# Patient Record
Sex: FEMALE | Race: OTHER | HISPANIC OR LATINO
[De-identification: names, ages, dates, MRNs, and addresses within clinical notes are randomized per-mention and may not be internally consistent; named-entity substitution may affect disease eponyms.]

---

## 2024-10-28 ENCOUNTER — NON-APPOINTMENT (OUTPATIENT)
Age: 39
End: 2024-10-28

## 2024-10-28 ENCOUNTER — APPOINTMENT (OUTPATIENT)
Dept: OBGYN | Facility: CLINIC | Age: 39
End: 2024-10-28
Payer: COMMERCIAL

## 2024-10-28 VITALS
DIASTOLIC BLOOD PRESSURE: 60 MMHG | HEIGHT: 61 IN | BODY MASS INDEX: 31.6 KG/M2 | SYSTOLIC BLOOD PRESSURE: 100 MMHG | WEIGHT: 167.38 LBS

## 2024-10-28 PROCEDURE — 0502F SUBSEQUENT PRENATAL CARE: CPT

## 2024-11-04 ENCOUNTER — APPOINTMENT (OUTPATIENT)
Dept: OBGYN | Facility: CLINIC | Age: 39
End: 2024-11-04

## 2024-11-04 ENCOUNTER — NON-APPOINTMENT (OUTPATIENT)
Age: 39
End: 2024-11-04

## 2024-11-08 ENCOUNTER — OUTPATIENT (OUTPATIENT)
Dept: OUTPATIENT SERVICES | Facility: HOSPITAL | Age: 39
LOS: 1 days | End: 2024-11-08
Payer: COMMERCIAL

## 2024-11-08 VITALS
TEMPERATURE: 98 F | DIASTOLIC BLOOD PRESSURE: 72 MMHG | HEART RATE: 72 BPM | WEIGHT: 164.91 LBS | OXYGEN SATURATION: 96 % | HEIGHT: 61 IN | RESPIRATION RATE: 16 BRPM | SYSTOLIC BLOOD PRESSURE: 114 MMHG

## 2024-11-08 DIAGNOSIS — Z98.891 HISTORY OF UTERINE SCAR FROM PREVIOUS SURGERY: ICD-10-CM

## 2024-11-08 DIAGNOSIS — Z01.818 ENCOUNTER FOR OTHER PREPROCEDURAL EXAMINATION: ICD-10-CM

## 2024-11-08 DIAGNOSIS — O34.211 MATERNAL CARE FOR LOW TRANSVERSE SCAR FROM PREVIOUS CESAREAN DELIVERY: ICD-10-CM

## 2024-11-08 DIAGNOSIS — Z29.9 ENCOUNTER FOR PROPHYLACTIC MEASURES, UNSPECIFIED: ICD-10-CM

## 2024-11-08 DIAGNOSIS — Z98.890 OTHER SPECIFIED POSTPROCEDURAL STATES: Chronic | ICD-10-CM

## 2024-11-08 DIAGNOSIS — Z98.891 HISTORY OF UTERINE SCAR FROM PREVIOUS SURGERY: Chronic | ICD-10-CM

## 2024-11-08 PROBLEM — Z78.9 OTHER SPECIFIED HEALTH STATUS: Chronic | Status: INACTIVE | Noted: 2024-04-01 | Resolved: 2024-11-08

## 2024-11-08 PROBLEM — Z78.9 OTHER SPECIFIED HEALTH STATUS: Chronic | Status: INACTIVE | Noted: 2020-12-28 | Resolved: 2024-11-08

## 2024-11-08 LAB
HCT VFR BLD CALC: 35.3 % — SIGNIFICANT CHANGE UP (ref 34.5–45)
HGB BLD-MCNC: 11.5 G/DL — SIGNIFICANT CHANGE UP (ref 11.5–15.5)
MCHC RBC-ENTMCNC: 29.6 PG — SIGNIFICANT CHANGE UP (ref 27–34)
MCHC RBC-ENTMCNC: 32.6 G/DL — SIGNIFICANT CHANGE UP (ref 32–36)
MCV RBC AUTO: 91 FL — SIGNIFICANT CHANGE UP (ref 80–100)
NRBC # BLD: 0 /100 WBCS — SIGNIFICANT CHANGE UP (ref 0–0)
PLATELET # BLD AUTO: 246 K/UL — SIGNIFICANT CHANGE UP (ref 150–400)
RBC # BLD: 3.88 M/UL — SIGNIFICANT CHANGE UP (ref 3.8–5.2)
RBC # FLD: 14 % — SIGNIFICANT CHANGE UP (ref 10.3–14.5)
WBC # BLD: 7.95 K/UL — SIGNIFICANT CHANGE UP (ref 3.8–10.5)
WBC # FLD AUTO: 7.95 K/UL — SIGNIFICANT CHANGE UP (ref 3.8–10.5)

## 2024-11-08 PROCEDURE — 86900 BLOOD TYPING SEROLOGIC ABO: CPT

## 2024-11-08 PROCEDURE — 86901 BLOOD TYPING SEROLOGIC RH(D): CPT

## 2024-11-08 PROCEDURE — G0463: CPT

## 2024-11-08 PROCEDURE — 85027 COMPLETE CBC AUTOMATED: CPT

## 2024-11-08 PROCEDURE — 86850 RBC ANTIBODY SCREEN: CPT

## 2024-11-08 RX ORDER — 0.9 % SODIUM CHLORIDE 0.9 %
1000 INTRAVENOUS SOLUTION INTRAVENOUS
Refills: 0 | Status: DISCONTINUED | OUTPATIENT
Start: 2024-11-15 | End: 2024-11-15

## 2024-11-08 RX ORDER — CHLORHEXIDINE GLUCONATE 1.2 MG/ML
1 RINSE ORAL DAILY
Refills: 0 | Status: DISCONTINUED | OUTPATIENT
Start: 2024-11-15 | End: 2024-11-15

## 2024-11-08 NOTE — OB PST NOTE - ASSESSMENT
DASI Score: 5.72 (pregnant status)  DASI Activity: able to go up one flight of stairs or walk 1-2 blocks with out difficulty  Loose or removable teeth: denies   CAPRINI SCORE    AGE RELATED RISK FACTORS                                                             [ ] Age 41-60 years                                            (1 Point)  [ ] Age: 61-74 years                                           (2 Points)                 [ ] Age= 75 years                                                (3 Points)             DISEASE RELATED RISK FACTORS                                                       [ ] Edema in the lower extremities                 (1 Point)                     [ ] Varicose veins                                               (1 Point)                                 [x ] BMI > 25 Kg/m2                                            (1 Point)                                  [ ] Serious infection (ie PNA)                            (1 Point)                     [ ] Lung disease ( COPD, Emphysema)            (1 Point)                                                                          [ ] Acute myocardial infarction                         (1 Point)                  [ ] Congestive heart failure (in the previous month)  (1 Point)         [ ] Inflammatory bowel disease                            (1 Point)                  [ ] Central venous access, PICC or Port               (2 points)       (within the last month)                                                                [ ] Stroke (in the previous month)                        (5 Points)    [ ] Previous or present malignancy                       (2 points)                                                                                                                                                         HEMATOLOGY RELATED FACTORS                                                         [ ] Prior episodes of VTE                                     (3 Points)                     [ ] Positive family history for VTE                      (3 Points)                  [ ] Prothrombin 66841 A                                     (3 Points)                     [ ] Factor V Leiden                                                (3 Points)                        [ ] Lupus anticoagulants                                      (3 Points)                                                           [ ] Anticardiolipin antibodies                              (3 Points)                                                       [ ] High homocysteine in the blood                   (3 Points)                                             [ ] Other congenital or acquired thrombophilia      (3 Points)                                                [ ] Heparin induced thrombocytopenia                  (3 Points)                                        MOBILITY RELATED FACTORS  [ ] Bed rest                                                         (1 Point)  [ ] Plaster cast                                                    (2 points)  [ ] Bed bound for more than 72 hours           (2 Points)    GENDER SPECIFIC FACTORS  [ x] Pregnancy or had a baby within the last month   (1 Point)  [ ] Post-partum < 6 weeks                                   (1 Point)  [ ] Hormonal therapy  or oral contraception   (1 Point)  [ ] History of pregnancy complications              (1 point)  [ ] Unexplained or recurrent              (1 Point)    OTHER RISK FACTORS                                           (1 Point)  [ ] BMI >40, smoking, diabetes requiring insulin, chemotherapy  blood transfusions and length of surgery over 2 hours    SURGERY RELATED RISK FACTORS  [ ]  Section within the last month     (1 Point)  [ ] Minor surgery                                                  (1 Point)  [ ] Arthroscopic surgery                                       (2 Points)  [ x] Planned major surgery lasting more            (2 Points)      than 45 minutes     [ ] Elective hip or knee joint replacement       (5 points)       surgery                                                TRAUMA RELATED RISK FACTORS  [ ] Fracture of the hip, pelvis, or leg                       (5 Points)  [ ] Spinal cord injury resulting in paralysis             (5 points)       (in the previous month)    [ ] Paralysis  (less than 1 month)                             (5 Points)  [ ] Multiple Trauma within 1 month                        (5 Points)    Total Score [4   ]    Caprini Score 0-2: Low Risk, NO VTE prophylaxis required for most patients, encourage ambulation  Caprini Score 3-6: Moderate Risk , pharmacologic VTE prophylaxis is indicated for most patients (in the absence of contraindications)  Caprini Score Greater than or =7: High risk, pharmocologic VTE prophylaxis indicated for most patients (in the absence of contraindications)

## 2024-11-08 NOTE — OB PST NOTE - HISTORY OF PRESENT ILLNESS
39 yr female  (LMP 2024) rresent to PST with  prior to scheduled Repeat  with Dr. Haas on 11/15/24. Previous medical hx of anxiety and depression, otherwise healthy. Pt reports good fetal movement but feeling contractions every 3 hrs. Offers no compliant at this time.

## 2024-11-08 NOTE — OB PST NOTE - PROBLEM SELECTOR PLAN 1
pt. scheduled for Repeat  with Dr. Haas on 11/15/24  Pre-op instructions given, all questions answered.  Surgical Soap given.  Labs: CBC, T&S

## 2024-11-12 ENCOUNTER — APPOINTMENT (OUTPATIENT)
Dept: ANTEPARTUM | Facility: CLINIC | Age: 39
End: 2024-11-12

## 2024-11-12 ENCOUNTER — APPOINTMENT (OUTPATIENT)
Dept: OBGYN | Facility: CLINIC | Age: 39
End: 2024-11-12

## 2024-11-15 ENCOUNTER — INPATIENT (INPATIENT)
Facility: HOSPITAL | Age: 39
LOS: 2 days | Discharge: HOME | End: 2024-11-18
Attending: OBSTETRICS & GYNECOLOGY | Admitting: OBSTETRICS & GYNECOLOGY
Payer: COMMERCIAL

## 2024-11-15 ENCOUNTER — APPOINTMENT (OUTPATIENT)
Dept: OBGYN | Facility: HOSPITAL | Age: 39
End: 2024-11-15

## 2024-11-15 ENCOUNTER — TRANSCRIPTION ENCOUNTER (OUTPATIENT)
Age: 39
End: 2024-11-15

## 2024-11-15 ENCOUNTER — NON-APPOINTMENT (OUTPATIENT)
Age: 39
End: 2024-11-15

## 2024-11-15 VITALS — SYSTOLIC BLOOD PRESSURE: 108 MMHG | HEART RATE: 73 BPM | DIASTOLIC BLOOD PRESSURE: 68 MMHG

## 2024-11-15 DIAGNOSIS — Z98.890 OTHER SPECIFIED POSTPROCEDURAL STATES: Chronic | ICD-10-CM

## 2024-11-15 DIAGNOSIS — Z98.891 HISTORY OF UTERINE SCAR FROM PREVIOUS SURGERY: ICD-10-CM

## 2024-11-15 DIAGNOSIS — Z98.891 HISTORY OF UTERINE SCAR FROM PREVIOUS SURGERY: Chronic | ICD-10-CM

## 2024-11-15 LAB
BLD GP AB SCN SERPL QL: NEGATIVE — SIGNIFICANT CHANGE UP
RH IG SCN BLD-IMP: POSITIVE — SIGNIFICANT CHANGE UP

## 2024-11-15 PROCEDURE — 88302 TISSUE EXAM BY PATHOLOGIST: CPT | Mod: 26

## 2024-11-15 PROCEDURE — 58611 LIGATE OVIDUCT(S) ADD-ON: CPT

## 2024-11-15 PROCEDURE — 59510 CESAREAN DELIVERY: CPT | Mod: U9,GC

## 2024-11-15 DEVICE — INTERCEED 3 X 4": Type: IMPLANTABLE DEVICE | Status: FUNCTIONAL

## 2024-11-15 RX ORDER — INFLUENZA VIRUS VACCINE 15; 15; 15; 15 UG/.5ML; UG/.5ML; UG/.5ML; UG/.5ML
0.5 SUSPENSION INTRAMUSCULAR ONCE
Refills: 0 | Status: DISCONTINUED | OUTPATIENT
Start: 2024-11-15 | End: 2024-11-18

## 2024-11-15 RX ORDER — OXYCODONE HYDROCHLORIDE 30 MG/1
5 TABLET ORAL ONCE
Refills: 0 | Status: DISCONTINUED | OUTPATIENT
Start: 2024-11-15 | End: 2024-11-18

## 2024-11-15 RX ORDER — OXYCODONE HYDROCHLORIDE 30 MG/1
5 TABLET ORAL
Refills: 0 | Status: COMPLETED | OUTPATIENT
Start: 2024-11-15 | End: 2024-11-22

## 2024-11-15 RX ORDER — KETOROLAC TROMETHAMINE 30 MG/ML
30 INJECTION INTRAMUSCULAR; INTRAVENOUS EVERY 6 HOURS
Refills: 0 | Status: DISCONTINUED | OUTPATIENT
Start: 2024-11-15 | End: 2024-11-17

## 2024-11-15 RX ORDER — OXYCODONE HYDROCHLORIDE 30 MG/1
10 TABLET ORAL
Refills: 0 | Status: DISCONTINUED | OUTPATIENT
Start: 2024-11-15 | End: 2024-11-16

## 2024-11-15 RX ORDER — IBUPROFEN 200 MG
600 TABLET ORAL EVERY 6 HOURS
Refills: 0 | Status: COMPLETED | OUTPATIENT
Start: 2024-11-15 | End: 2025-10-14

## 2024-11-15 RX ORDER — TRISODIUM CITRATE DIHYDRATE AND CITRIC ACID MONOHYDRATE 500; 334 MG/5ML; MG/5ML
15 SOLUTION ORAL ONCE
Refills: 0 | Status: COMPLETED | OUTPATIENT
Start: 2024-11-15 | End: 2024-11-15

## 2024-11-15 RX ORDER — OXYCODONE HYDROCHLORIDE 30 MG/1
5 TABLET ORAL
Refills: 0 | Status: DISCONTINUED | OUTPATIENT
Start: 2024-11-15 | End: 2024-11-16

## 2024-11-15 RX ORDER — NALOXONE HCL 0.4 MG/ML
0.1 AMPUL (ML) INJECTION
Refills: 0 | Status: DISCONTINUED | OUTPATIENT
Start: 2024-11-15 | End: 2024-11-16

## 2024-11-15 RX ORDER — TETANUS TOXOID, REDUCED DIPHTHERIA TOXOID AND ACELLULAR PERTUSSIS VACCINE, ADSORBED 5; 2.5; 8; 8; 2.5 [IU]/.5ML; [IU]/.5ML; UG/.5ML; UG/.5ML; UG/.5ML
0.5 SUSPENSION INTRAMUSCULAR ONCE
Refills: 0 | Status: DISCONTINUED | OUTPATIENT
Start: 2024-11-15 | End: 2024-11-18

## 2024-11-15 RX ORDER — ACETAMINOPHEN 500MG 500 MG/1
975 TABLET, COATED ORAL
Refills: 0 | Status: DISCONTINUED | OUTPATIENT
Start: 2024-11-15 | End: 2024-11-16

## 2024-11-15 RX ORDER — LANOLIN 72 %
1 OINTMENT (GRAM) TOPICAL EVERY 6 HOURS
Refills: 0 | Status: DISCONTINUED | OUTPATIENT
Start: 2024-11-15 | End: 2024-11-18

## 2024-11-15 RX ORDER — FAMOTIDINE 20 MG/1
20 TABLET, FILM COATED ORAL ONCE
Refills: 0 | Status: COMPLETED | OUTPATIENT
Start: 2024-11-15 | End: 2024-11-15

## 2024-11-15 RX ORDER — ONDANSETRON HYDROCHLORIDE 4 MG/1
4 TABLET, FILM COATED ORAL EVERY 6 HOURS
Refills: 0 | Status: DISCONTINUED | OUTPATIENT
Start: 2024-11-15 | End: 2024-11-16

## 2024-11-15 RX ORDER — SIMETHICONE 125 MG
80 CAPSULE ORAL EVERY 4 HOURS
Refills: 0 | Status: DISCONTINUED | OUTPATIENT
Start: 2024-11-15 | End: 2024-11-18

## 2024-11-15 RX ORDER — DIPHENHYDRAMINE HCL 25 MG
25 CAPSULE ORAL EVERY 6 HOURS
Refills: 0 | Status: DISCONTINUED | OUTPATIENT
Start: 2024-11-15 | End: 2024-11-18

## 2024-11-15 RX ORDER — DEXAMETHASONE 1.5 MG/1
4 TABLET ORAL EVERY 6 HOURS
Refills: 0 | Status: DISCONTINUED | OUTPATIENT
Start: 2024-11-15 | End: 2024-11-16

## 2024-11-15 RX ORDER — 0.9 % SODIUM CHLORIDE 0.9 %
1000 INTRAVENOUS SOLUTION INTRAVENOUS
Refills: 0 | Status: DISCONTINUED | OUTPATIENT
Start: 2024-11-15 | End: 2024-11-18

## 2024-11-15 RX ORDER — NALBUPHINE HYDROCHLORIDE 10 MG/ML
2.5 INJECTION INTRAMUSCULAR; INTRAVENOUS; SUBCUTANEOUS EVERY 6 HOURS
Refills: 0 | Status: DISCONTINUED | OUTPATIENT
Start: 2024-11-15 | End: 2024-11-16

## 2024-11-15 RX ADMIN — ACETAMINOPHEN 500MG 975 MILLIGRAM(S): 500 TABLET, COATED ORAL at 21:44

## 2024-11-15 RX ADMIN — ACETAMINOPHEN 500MG 975 MILLIGRAM(S): 500 TABLET, COATED ORAL at 22:14

## 2024-11-15 RX ADMIN — TRISODIUM CITRATE DIHYDRATE AND CITRIC ACID MONOHYDRATE 15 MILLILITER(S): 500; 334 SOLUTION ORAL at 13:40

## 2024-11-15 RX ADMIN — FAMOTIDINE 20 MILLIGRAM(S): 20 TABLET, FILM COATED ORAL at 13:40

## 2024-11-15 NOTE — OB RN DELIVERY SUMMARY - NS_BIRTHTRAUMAA_OBGYN_ALL_OB
Medication:   Requested Prescriptions     Pending Prescriptions Disp Refills    vitamin D (ERGOCALCIFEROL) 1.25 MG (80443 UT) CAPS capsule [Pharmacy Med Name: Vitamin D (Ergocalciferol) Oral Capsule 1.25 MG (67554 UT)] 12 capsule 0     Sig: TAKE 1 CAPSULE BY MOUTH ONE TIME A WEEK FOR 12 WEEKS        Last Filled:      Patient Phone Number: [phone] (home)     Last appt: 4/1/2022   Next appt: 7/11/2022    Last OARRS: No flowsheet data found.
None

## 2024-11-15 NOTE — OB RN DELIVERY SUMMARY - NS_SEPSISRSKCALC_OBGYN_ALL_OB_FT
EOS calculated successfully. EOS Risk Factor: 0.5/1000 live births ([facility] national incidence); GA=39w;Temp=98.2; ROM=0; GBS='Negative'; Antibiotics='No antibiotics or any antibiotics < 2 hrs prior to birth'

## 2024-11-15 NOTE — OB PROVIDER DELIVERY SUMMARY - NSPROVIDERDELIVERYNOTE_OBGYN_ALL_OB_FT
Viable male infant, 3000g, 9/9 APGARS, cephalic presentation.    Normal uterus, tubes, and ovaries.    Hysterotomy was closed in 1 layer with vicryl. Interceed placed over the hysterotomy. Bilateral salpingectomy with the Ligasure. Fascia closed with vicryl. Subcutaneous reapproximated with plain gut. Deep dermal layer with plain gut. Subcuticular skin closure with the Quil.    EBL: 339  IVF: 2000  UOP: 500    DICTATION #:    L Andrew PGY2 Viable male infant, 3000g, 9/9 APGARS, cephalic presentation.    Normal uterus, tubes, and ovaries.    Hysterotomy was closed in 1 layer with vicryl. Interceed placed over the hysterotomy. Bilateral salpingectomy with the Ligasure. Fascia closed with vicryl. Subcutaneous reapproximated with plain gut. Deep dermal layer with plain gut. Subcuticular skin closure with the Quil.    EBL: 339  IVF: 2000  UOP: 500    DICTATION #: [id number]    L Andrew PGY2

## 2024-11-15 NOTE — OB PROVIDER H&P - ASSESSMENT
A/P: 39y   @ 39 0/7wks presents for scheduled repeat c/section.    Admit to L&D  EFM/[place]  Routine labs  IVFluids  NPO/Bicitra  Anesthesia c/s  PreOp Prep  Dr. Haas aware

## 2024-11-15 NOTE — PACU DISCHARGE NOTE - NSCLINEINSERTRD_GEN_ALL_CORE
Quality 226: Preventive Care And Screening: Tobacco Use: Screening And Cessation Intervention: Patient screened for tobacco use and is an ex/non-smoker Detail Level: Detailed Quality 431: Preventive Care And Screening: Unhealthy Alcohol Use - Screening: Patient not identified as an unhealthy alcohol user when screened for unhealthy alcohol use using a systematic screening method Quality 130: Documentation Of Current Medications In The Medical Record: Current Medications Documented No

## 2024-11-15 NOTE — OB PROVIDER H&P - NS ATTEND AMEND GEN_ALL_CORE FT
OBGYN Attending Admission Note:  40 y/o  at 39wks EGA admitted for delivery. Patinet scheduled for repeat C/S, request permanent sterilazation with bilateral salpingectomy. Paitnet signed informed consent.  Counseled on risks and benefits. including but not limited to bleeding, infection, adhesion formation, bowel and or bladder injury. Permanent sterilization, irreversible, risk of bleeding, risk of ectopic pregnancy, decreases risk of ovarian cancer with bilateral salpingectomy.  Admit to L/D, EFM, [place], IVF, labs   +FH  Patinet set up for Repeat C/S and B/L salpingectomy.   Charis Haas MD

## 2024-11-15 NOTE — OB RN DELIVERY SUMMARY - NSSELHIDDEN_OBGYN_ALL_OB_FT
[NS_DeliveryAttending1_OBGYN_ALL_OB_FT:[id number]],[NS_DeliveryRN_OBGYN_ALL_OB_FT:[id number]] [NS_DeliveryAttending1_OBGYN_ALL_OB_FT:[id number]=],[NS_DeliveryRN_OBGYN_ALL_OB_FT:[id number]==],[NS_DeliveryAssist1_OBGYN_ALL_OB_FT:[id number]=]

## 2024-11-15 NOTE — OB RN INTRAOPERATIVE NOTE - NSSELHIDDEN_OBGYN_ALL_OB_FT
[NS_DeliveryAttending1_OBGYN_ALL_OB_FT:[id number]],[NS_DeliveryRN_OBGYN_ALL_OB_FT:[id number]] [NS_DeliveryAttending1_OBGYN_ALL_OB_FT:[id number]=],[NS_DeliveryRN_OBGYN_ALL_OB_FT:[id number]==],[NS_DeliveryAssist1_OBGYN_ALL_OB_FT:[id number]=]

## 2024-11-15 NOTE — OB PROVIDER H&P - HISTORY OF PRESENT ILLNESS
39y   @ 39 0/7wks presents for scheduled repeat c/section.  (+) fetal movement. Denies CTX, leakage of fluid or vaginal bleeding. PNC uncomplicated.    EFW 3000  GBS    Ax: NKDA  MedHx: denies  Meds: PNV  ObHx: 2011 pLTCS Girl, failure to dilate  GynHx: denies abnl paps/cysts/ fibroids/endometriosis/ HPV  SurgHx: c/sec  FamHx:  denies  Pt accepts blood products   39y   @ 39 0/7wks presents for scheduled repeat c/section.  (+) fetal movement. Denies CTX, leakage of fluid or vaginal bleeding. PNC uncomplicated.    EFW 3000  GBS Neg    Ax: NKDA  MedHx: denies  Meds: PNV  ObHx: 2011 pLTCS Girl, failure to dilate  GynHx: denies abnl paps/cysts/ fibroids/endometriosis/ HPV  SurgHx: c/sec  FamHx:  denies  Pt accepts blood products

## 2024-11-15 NOTE — OB PROVIDER H&P - NSHPPHYSICALEXAM_GEN_ALL_CORE
PE:  T(C): 36.8 (11-15-24 @ 13:11), Max: 36.8 (11-15-24 @ 13:11)  HR: 73 (11-15-24 @ 13:49) (67 - 76)  BP: 108/68 (11-15-24 @ 13:11) (108/68 - 108/68)  RR: 18 (11-15-24 @ 13:11) (18 - 18)  SpO2: 98% (11-15-24 @ 13:49) (96% - 99%)  CV: RRR  Lungs: CTA B/L  Abd: soft/NT, gravid  EFM: 135/ mod nick/ (+) accel/ (-) decel  [place]: occ ctx  VE: deferred

## 2024-11-15 NOTE — OB PROVIDER DELIVERY SUMMARY - NSLOWPPHRISK_OBGYN_A_OB
[place] Pregnancy/Less than or equal to 4 previous vaginal births/No known bleeding disorder/No history of postpartum hemorrhage

## 2024-11-15 NOTE — OB RN PATIENT PROFILE - NS_REVCONTRACEPTIVE_OBGYN_ALL_OB
1. The patient was informed that the symptoms are likely related to their internal hemorrhoids. The vast majority (85%) of internal hemorrhoids respond to conservative measures.    2. I recommend a High fiber diet (30 g of  fiber daily) that is rich in whole grain oats, raw leafy greens, fruits and vegetables  3.  Benefiber 1 tablespoon with 8 ounces of water twice a day  4. Increase water intake to at least 64 ounces water daily  5. Warm Sitz baths 2-3 times a day  6. I expect that the symptoms will respond to conservative measures     No

## 2024-11-15 NOTE — OB PROVIDER DELIVERY SUMMARY - NSSELHIDDEN_OBGYN_ALL_OB_FT
[NS_DeliveryAttending1_OBGYN_ALL_OB_FT:[id number]=],[NS_DeliveryRN_OBGYN_ALL_OB_FT:[id number]==],[NS_DeliveryAssist1_OBGYN_ALL_OB_FT:[id number]=]

## 2024-11-16 LAB
BASOPHILS # BLD AUTO: 0.01 K/UL — SIGNIFICANT CHANGE UP (ref 0–0.2)
BASOPHILS # BLD AUTO: 0.03 K/UL — SIGNIFICANT CHANGE UP (ref 0–0.2)
BASOPHILS NFR BLD AUTO: 0.1 % — SIGNIFICANT CHANGE UP (ref 0–2)
BASOPHILS NFR BLD AUTO: 0.2 % — SIGNIFICANT CHANGE UP (ref 0–2)
EOSINOPHIL # BLD AUTO: 0 K/UL — SIGNIFICANT CHANGE UP (ref 0–0.5)
EOSINOPHIL # BLD AUTO: 0 K/UL — SIGNIFICANT CHANGE UP (ref 0–0.5)
EOSINOPHIL NFR BLD AUTO: 0 % — SIGNIFICANT CHANGE UP (ref 0–6)
EOSINOPHIL NFR BLD AUTO: 0 % — SIGNIFICANT CHANGE UP (ref 0–6)
GLUCOSE BLDC GLUCOMTR-MCNC: 130 MG/DL — HIGH (ref 70–99)
HCT VFR BLD CALC: 26.9 % — LOW (ref 34.5–45)
HCT VFR BLD CALC: 30.5 % — LOW (ref 34.5–45)
HGB BLD-MCNC: 10 G/DL — LOW (ref 11.5–15.5)
HGB BLD-MCNC: 8.8 G/DL — LOW (ref 11.5–15.5)
IMM GRANULOCYTES NFR BLD AUTO: 0.6 % — SIGNIFICANT CHANGE UP (ref 0–0.9)
IMM GRANULOCYTES NFR BLD AUTO: 0.8 % — SIGNIFICANT CHANGE UP (ref 0–0.9)
LYMPHOCYTES # BLD AUTO: 1.18 K/UL — SIGNIFICANT CHANGE UP (ref 1–3.3)
LYMPHOCYTES # BLD AUTO: 1.5 K/UL — SIGNIFICANT CHANGE UP (ref 1–3.3)
LYMPHOCYTES # BLD AUTO: 11 % — LOW (ref 13–44)
LYMPHOCYTES # BLD AUTO: 7.6 % — LOW (ref 13–44)
MCHC RBC-ENTMCNC: 29.7 PG — SIGNIFICANT CHANGE UP (ref 27–34)
MCHC RBC-ENTMCNC: 29.8 PG — SIGNIFICANT CHANGE UP (ref 27–34)
MCHC RBC-ENTMCNC: 32.7 G/DL — SIGNIFICANT CHANGE UP (ref 32–36)
MCHC RBC-ENTMCNC: 32.8 G/DL — SIGNIFICANT CHANGE UP (ref 32–36)
MCV RBC AUTO: 90.5 FL — SIGNIFICANT CHANGE UP (ref 80–100)
MCV RBC AUTO: 91.2 FL — SIGNIFICANT CHANGE UP (ref 80–100)
MONOCYTES # BLD AUTO: 0.96 K/UL — HIGH (ref 0–0.9)
MONOCYTES # BLD AUTO: 1.05 K/UL — HIGH (ref 0–0.9)
MONOCYTES NFR BLD AUTO: 6.2 % — SIGNIFICANT CHANGE UP (ref 2–14)
MONOCYTES NFR BLD AUTO: 7.7 % — SIGNIFICANT CHANGE UP (ref 2–14)
NEUTROPHILS # BLD AUTO: 10.91 K/UL — HIGH (ref 1.8–7.4)
NEUTROPHILS # BLD AUTO: 13.31 K/UL — HIGH (ref 1.8–7.4)
NEUTROPHILS NFR BLD AUTO: 80.4 % — HIGH (ref 43–77)
NEUTROPHILS NFR BLD AUTO: 85.4 % — HIGH (ref 43–77)
NRBC # BLD: 0 /100 WBCS — SIGNIFICANT CHANGE UP (ref 0–0)
NRBC # BLD: 0 /100 WBCS — SIGNIFICANT CHANGE UP (ref 0–0)
PLATELET # BLD AUTO: 219 K/UL — SIGNIFICANT CHANGE UP (ref 150–400)
PLATELET # BLD AUTO: 236 K/UL — SIGNIFICANT CHANGE UP (ref 150–400)
RBC # BLD: 2.95 M/UL — LOW (ref 3.8–5.2)
RBC # BLD: 3.37 M/UL — LOW (ref 3.8–5.2)
RBC # FLD: 13.8 % — SIGNIFICANT CHANGE UP (ref 10.3–14.5)
RBC # FLD: 13.9 % — SIGNIFICANT CHANGE UP (ref 10.3–14.5)
T PALLIDUM AB TITR SER: NEGATIVE — SIGNIFICANT CHANGE UP
WBC # BLD: 13.58 K/UL — HIGH (ref 3.8–10.5)
WBC # BLD: 15.57 K/UL — HIGH (ref 3.8–10.5)
WBC # FLD AUTO: 13.58 K/UL — HIGH (ref 3.8–10.5)
WBC # FLD AUTO: 15.57 K/UL — HIGH (ref 3.8–10.5)

## 2024-11-16 RX ORDER — 0.9 % SODIUM CHLORIDE 0.9 %
1000 INTRAVENOUS SOLUTION INTRAVENOUS ONCE
Refills: 0 | Status: DISCONTINUED | OUTPATIENT
Start: 2024-11-16 | End: 2024-11-18

## 2024-11-16 RX ORDER — ACETAMINOPHEN 500MG 500 MG/1
975 TABLET, COATED ORAL EVERY 6 HOURS
Refills: 0 | Status: DISCONTINUED | OUTPATIENT
Start: 2024-11-16 | End: 2024-11-17

## 2024-11-16 RX ORDER — HEPARIN SODIUM,PORCINE 1000/ML
5000 VIAL (ML) INJECTION EVERY 12 HOURS
Refills: 0 | Status: DISCONTINUED | OUTPATIENT
Start: 2024-11-16 | End: 2024-11-18

## 2024-11-16 RX ADMIN — ACETAMINOPHEN 500MG 975 MILLIGRAM(S): 500 TABLET, COATED ORAL at 03:31

## 2024-11-16 RX ADMIN — KETOROLAC TROMETHAMINE 30 MILLIGRAM(S): 30 INJECTION INTRAMUSCULAR; INTRAVENOUS at 01:32

## 2024-11-16 RX ADMIN — KETOROLAC TROMETHAMINE 30 MILLIGRAM(S): 30 INJECTION INTRAMUSCULAR; INTRAVENOUS at 05:18

## 2024-11-16 RX ADMIN — ACETAMINOPHEN 500MG 975 MILLIGRAM(S): 500 TABLET, COATED ORAL at 16:30

## 2024-11-16 RX ADMIN — ACETAMINOPHEN 500MG 975 MILLIGRAM(S): 500 TABLET, COATED ORAL at 04:01

## 2024-11-16 RX ADMIN — KETOROLAC TROMETHAMINE 30 MILLIGRAM(S): 30 INJECTION INTRAMUSCULAR; INTRAVENOUS at 12:56

## 2024-11-16 RX ADMIN — KETOROLAC TROMETHAMINE 30 MILLIGRAM(S): 30 INJECTION INTRAMUSCULAR; INTRAVENOUS at 23:29

## 2024-11-16 RX ADMIN — Medication 80 MILLIGRAM(S): at 19:10

## 2024-11-16 RX ADMIN — KETOROLAC TROMETHAMINE 30 MILLIGRAM(S): 30 INJECTION INTRAMUSCULAR; INTRAVENOUS at 01:02

## 2024-11-16 RX ADMIN — ACETAMINOPHEN 500MG 975 MILLIGRAM(S): 500 TABLET, COATED ORAL at 15:38

## 2024-11-16 RX ADMIN — Medication 5000 UNIT(S): at 21:16

## 2024-11-16 RX ADMIN — KETOROLAC TROMETHAMINE 30 MILLIGRAM(S): 30 INJECTION INTRAMUSCULAR; INTRAVENOUS at 19:10

## 2024-11-16 RX ADMIN — KETOROLAC TROMETHAMINE 30 MILLIGRAM(S): 30 INJECTION INTRAMUSCULAR; INTRAVENOUS at 13:50

## 2024-11-16 RX ADMIN — ACETAMINOPHEN 500MG 975 MILLIGRAM(S): 500 TABLET, COATED ORAL at 20:58

## 2024-11-16 RX ADMIN — ACETAMINOPHEN 500MG 975 MILLIGRAM(S): 500 TABLET, COATED ORAL at 10:30

## 2024-11-16 RX ADMIN — ACETAMINOPHEN 500MG 975 MILLIGRAM(S): 500 TABLET, COATED ORAL at 21:30

## 2024-11-16 RX ADMIN — ACETAMINOPHEN 500MG 975 MILLIGRAM(S): 500 TABLET, COATED ORAL at 09:42

## 2024-11-16 RX ADMIN — KETOROLAC TROMETHAMINE 30 MILLIGRAM(S): 30 INJECTION INTRAMUSCULAR; INTRAVENOUS at 05:48

## 2024-11-16 RX ADMIN — KETOROLAC TROMETHAMINE 30 MILLIGRAM(S): 30 INJECTION INTRAMUSCULAR; INTRAVENOUS at 20:00

## 2024-11-16 NOTE — PROVIDER CONTACT NOTE (CHANGE IN STATUS NOTIFICATION) - ASSESSMENT
VS : 73/41, DC 55, 98 o2 , temp 99.5. Pt on toilet fainted. Pt states seeing black. Glucose: 130, repeated vs : 130 , 94/60.

## 2024-11-16 NOTE — PROGRESS NOTE ADULT - SUBJECTIVE AND OBJECTIVE BOX
R1 Progress Note    Patient seen and examined at bedside. Overnight, pt had a vasovagal/ hypotensive event (see chart note). No acute complaints, pain well controlled. Patient is ambulating and tolerating regular diet. Has not yet passed flatus. Coello is still in place. Denies fever, chills, CP, SOB, N/V, HA, blurred vision.     Vital Signs Last 24 Hours  T(C): 37.1 (11-16-24 @ 00:51), Max: 37.1 (11-16-24 @ 00:51)  HR: 73 (11-15-24 @ 20:30) (60 - 76)  BP: 95/60 (11-15-24 @ 20:30) (90/54 - 108/68)  RR: 18 (11-16-24 @ 00:51) (17 - 19)  SpO2: 95% (11-16-24 @ 00:51) (94% - 99%)    I&O's Summary    15 Nov 2024 07:01  -  16 Nov 2024 04:04  --------------------------------------------------------  IN: 0 mL / OUT: 1139 mL / NET: -1139 mL        Physical Exam:  General: NAD  Abdomen: Soft, non-tender, non-distended, fundus firm  Incision: Pfannenstiel incision CDI, subcuticular suture closure  Pelvic: Lochia wnl  : coello in place    Labs:    Blood Type: A Positive  Antibody Screen: Negative  RPR: Negative               10.0   15.57 )-----------( 236      ( 11-16 @ 01:04 )             30.5                11.5   7.95  )-----------( 246      ( 11-08 @ 16:14 )             35.3         MEDICATIONS  (STANDING):  acetaminophen   Oral Liquid .. 975 milliGRAM(s) Oral every 6 hours  diphtheria/tetanus/pertussis (acellular) Vaccine (Adacel) 0.5 milliLiter(s) IntraMuscular once  ibuprofen  Tablet. 600 milliGRAM(s) Oral every 6 hours  influenza   Vaccine 0.5 milliLiter(s) IntraMuscular once  ketorolac   Injectable 30 milliGRAM(s) IV Push every 6 hours  lactated ringers Bolus 1000 milliLiter(s) IV Bolus once  lactated ringers. 1000 milliLiter(s) (125 mL/Hr) IV Continuous <Continuous>  morphine PF Spinal 0.1 milliGRAM(s) IntraThecal. once  oxytocin Infusion 42 milliUNIT(s)/Min (42 mL/Hr) IV Continuous <Continuous>    MEDICATIONS  (PRN):  dexAMETHasone  Injectable 4 milliGRAM(s) IV Push every 6 hours PRN Nausea  diphenhydrAMINE 25 milliGRAM(s) Oral every 6 hours PRN Pruritus  lanolin Ointment 1 Application(s) Topical every 6 hours PRN Sore Nipples  magnesium hydroxide Suspension 30 milliLiter(s) Oral two times a day PRN Constipation  nalbuphine Injectable 2.5 milliGRAM(s) IV Push every 6 hours PRN Pruritus  naloxone Injectable 0.1 milliGRAM(s) IV Push every 3 minutes PRN For ANY of the following changes in patient status:  A. Breaths Per Minute LESS THAN 10, B. Oxygen saturation LESS THAN 90%, C. Sedation score of 6 for Stop After: 4 Times  ondansetron Injectable 4 milliGRAM(s) IV Push every 6 hours PRN Nausea  oxyCODONE    IR 5 milliGRAM(s) Oral every 3 hours PRN Mild Pain (1 - 3)  oxyCODONE    IR 10 milliGRAM(s) Oral every 3 hours PRN Moderate Pain (4 - 6)  oxyCODONE    IR 5 milliGRAM(s) Oral every 3 hours PRN Moderate to Severe Pain (4-10)  oxyCODONE    IR 5 milliGRAM(s) Oral once PRN Moderate to Severe Pain (4-10)  simethicone 80 milliGRAM(s) Chew every 4 hours PRN Gas   R1 Progress Note    Patient seen and examined at bedside. Overnight, pt had a vasovagal/ hypotensive event (see chart note). No acute complaints, pain well controlled. Patient is ambulating and tolerating regular diet. Pt has passed flatus. Coello is still in place. Denies fever, chills, CP, SOB, N/V, HA, blurred vision.     Vital Signs Last 24 Hours  T(C): 37.1 (11-16-24 @ 00:51), Max: 37.1 (11-16-24 @ 00:51)  HR: 73 (11-15-24 @ 20:30) (60 - 76)  BP: 95/60 (11-15-24 @ 20:30) (90/54 - 108/68)  RR: 18 (11-16-24 @ 00:51) (17 - 19)  SpO2: 95% (11-16-24 @ 00:51) (94% - 99%)    I&O's Summary    15 Nov 2024 07:01  -  16 Nov 2024 04:04  --------------------------------------------------------  IN: 0 mL / OUT: 1139 mL / NET: -1139 mL        Physical Exam:  General: NAD  Abdomen: Soft, non-tender, non-distended, fundus firm  Incision: Pfannenstiel incision CDI, subcuticular suture closure  Pelvic: Lochia wnl  : coello in place    Labs:    Blood Type: A Positive  Antibody Screen: Negative  RPR: Negative               10.0   15.57 )-----------( 236      ( 11-16 @ 01:04 )             30.5                11.5   7.95  )-----------( 246      ( 11-08 @ 16:14 )             35.3         MEDICATIONS  (STANDING):  acetaminophen   Oral Liquid .. 975 milliGRAM(s) Oral every 6 hours  diphtheria/tetanus/pertussis (acellular) Vaccine (Adacel) 0.5 milliLiter(s) IntraMuscular once  ibuprofen  Tablet. 600 milliGRAM(s) Oral every 6 hours  influenza   Vaccine 0.5 milliLiter(s) IntraMuscular once  ketorolac   Injectable 30 milliGRAM(s) IV Push every 6 hours  lactated ringers Bolus 1000 milliLiter(s) IV Bolus once  lactated ringers. 1000 milliLiter(s) (125 mL/Hr) IV Continuous <Continuous>  morphine PF Spinal 0.1 milliGRAM(s) IntraThecal. once  oxytocin Infusion 42 milliUNIT(s)/Min (42 mL/Hr) IV Continuous <Continuous>    MEDICATIONS  (PRN):  dexAMETHasone  Injectable 4 milliGRAM(s) IV Push every 6 hours PRN Nausea  diphenhydrAMINE 25 milliGRAM(s) Oral every 6 hours PRN Pruritus  lanolin Ointment 1 Application(s) Topical every 6 hours PRN Sore Nipples  magnesium hydroxide Suspension 30 milliLiter(s) Oral two times a day PRN Constipation  nalbuphine Injectable 2.5 milliGRAM(s) IV Push every 6 hours PRN Pruritus  naloxone Injectable 0.1 milliGRAM(s) IV Push every 3 minutes PRN For ANY of the following changes in patient status:  A. Breaths Per Minute LESS THAN 10, B. Oxygen saturation LESS THAN 90%, C. Sedation score of 6 for Stop After: 4 Times  ondansetron Injectable 4 milliGRAM(s) IV Push every 6 hours PRN Nausea  oxyCODONE    IR 5 milliGRAM(s) Oral every 3 hours PRN Mild Pain (1 - 3)  oxyCODONE    IR 10 milliGRAM(s) Oral every 3 hours PRN Moderate Pain (4 - 6)  oxyCODONE    IR 5 milliGRAM(s) Oral every 3 hours PRN Moderate to Severe Pain (4-10)  oxyCODONE    IR 5 milliGRAM(s) Oral once PRN Moderate to Severe Pain (4-10)  simethicone 80 milliGRAM(s) Chew every 4 hours PRN Gas

## 2024-11-16 NOTE — CHART NOTE - NSCHARTNOTEFT_GEN_A_CORE
Pt seen and examined at bedside after vasovagal episode as per nursing. As per nursing, pt passed her orthostatics before ambulating to the bathroom in order to remove her coello. Upon sitting down on the toilet, she started getting weak and then became limp for a few seconds. Immediate vitals revealed BP: 73/41 HR 52. Pt was placed on wheelchair and VS repeated BP: 94/60 HR: 71. Fingerstick 130s.      ID: [id number]    At bedside, patient endorsed the story above where she began walking and then felt weak when sitting down on the toilet. She then saw darkness and then came back to. At this time, she denied light-headedness, dizziness, palpitations, chest pain.     PE:   Vitals as above  Gen: sitting comfortably on wheel chair  Pulm: breathing comfortably on room air   Abd: soft, appropriately tender, mildly distended  Incision: pfannenstiel incision c/d/i  : coello in place with adequate UOP, lochia wnl     39 y POD1 from rLTCS and BS (). Pt hilda experienced an acute hypotensive episode. Pt otherwise well appearing, vitals improved, with moderate bleeding.     - STAT CBC  - 1L bolus  - Keep coello in until AM    D/w Dr. Waldo Frank PGY1

## 2024-11-16 NOTE — PROGRESS NOTE ADULT - SUBJECTIVE AND OBJECTIVE BOX
Day 1 of Anesthesia Pain Management Service    SUBJECTIVE:  Pain Scale Score:          [X] Refer to charted pain scores    THERAPY:    s/p neuraxial PF morphine    MEDICATIONS  (STANDING):  acetaminophen   Oral Liquid .. 975 milliGRAM(s) Oral every 6 hours  diphtheria/tetanus/pertussis (acellular) Vaccine (Adacel) 0.5 milliLiter(s) IntraMuscular once  ibuprofen  Tablet. 600 milliGRAM(s) Oral every 6 hours  influenza   Vaccine 0.5 milliLiter(s) IntraMuscular once  ketorolac   Injectable 30 milliGRAM(s) IV Push every 6 hours  lactated ringers Bolus 1000 milliLiter(s) IV Bolus once  lactated ringers. 1000 milliLiter(s) (125 mL/Hr) IV Continuous <Continuous>  morphine PF Spinal 0.1 milliGRAM(s) IntraThecal. once  oxytocin Infusion 42 milliUNIT(s)/Min (42 mL/Hr) IV Continuous <Continuous>    MEDICATIONS  (PRN):  dexAMETHasone  Injectable 4 milliGRAM(s) IV Push every 6 hours PRN Nausea  diphenhydrAMINE 25 milliGRAM(s) Oral every 6 hours PRN Pruritus  lanolin Ointment 1 Application(s) Topical every 6 hours PRN Sore Nipples  magnesium hydroxide Suspension 30 milliLiter(s) Oral two times a day PRN Constipation  nalbuphine Injectable 2.5 milliGRAM(s) IV Push every 6 hours PRN Pruritus  naloxone Injectable 0.1 milliGRAM(s) IV Push every 3 minutes PRN For ANY of the following changes in patient status:  A. Breaths Per Minute LESS THAN 10, B. Oxygen saturation LESS THAN 90%, C. Sedation score of 6 for Stop After: 4 Times  ondansetron Injectable 4 milliGRAM(s) IV Push every 6 hours PRN Nausea  oxyCODONE    IR 5 milliGRAM(s) Oral every 3 hours PRN Mild Pain (1 - 3)  oxyCODONE    IR 10 milliGRAM(s) Oral every 3 hours PRN Moderate Pain (4 - 6)  oxyCODONE    IR 5 milliGRAM(s) Oral every 3 hours PRN Moderate to Severe Pain (4-10)  oxyCODONE    IR 5 milliGRAM(s) Oral once PRN Moderate to Severe Pain (4-10)  simethicone 80 milliGRAM(s) Chew every 4 hours PRN Gas      OBJECTIVE:    Sedation:        	[X] Alert	[ ] Drowsy	[ ] Arousable      [ ] Asleep       [ ] Unresponsive    Side Effects:	[X] None	[ ] Nausea	[ ] Vomiting         [ ] Pruritus  		[ ] Weakness            [ ] Numbness	          [ ] Other:    Vital Signs Last 24 Hrs  T(C): 36.6 (16 Nov 2024 05:46), Max: 37.1 (16 Nov 2024 00:51)  T(F): 97.9 (16 Nov 2024 05:46), Max: 98.7 (16 Nov 2024 00:51)  HR: 82 (16 Nov 2024 05:46) (60 - 82)  BP: 91/52 (16 Nov 2024 05:46) (90/54 - 108/68)  BP(mean): 70 (15 Nov 2024 18:10) (66 - 73)  RR: 18 (16 Nov 2024 05:46) (17 - 19)  SpO2: 97% (16 Nov 2024 05:46) (94% - 99%)    Parameters below as of 16 Nov 2024 05:46  Patient On (Oxygen Delivery Method): room air        ASSESSMENT/ PLAN  [X] Patient transitioned to prn analgesics  [X] Pain management per primary service, pain service to sign off   [X]Documentation and Verification of current medications

## 2024-11-16 NOTE — PROGRESS NOTE ADULT - ASSESSMENT
39 y POD1 from rLTCS and BS (). Pt had hypotensive/ vasovagal episode overnight. Pt currently stable and meeting pp milestones.     #Postpartum  - Continue with po analgesia  - Increase ambulation  - Continue regular diet  - AM CBC   - Remove coello in AM -> DTV  - Incision dressing removed/ incision site c/d/i    Michelle Frank PGY1 39 y POD1 from rLTCS and BS (). Pt had hypotensive/ vasovagal episode overnight. Pt currently stable.     #Postpartum  - Continue with po analgesia  - Increase ambulation  - Continue regular diet  - AM CBC   - Remove coello in AM -> DTV  - Incision dressing removed/ incision site c/d/i    Michelle Frank PGY1

## 2024-11-17 RX ORDER — ACETAMINOPHEN 500MG 500 MG/1
2 TABLET, COATED ORAL
Qty: 0 | Refills: 0 | DISCHARGE
Start: 2024-11-17

## 2024-11-17 RX ORDER — OXYCODONE HYDROCHLORIDE 30 MG/1
5 TABLET ORAL
Refills: 0 | Status: DISCONTINUED | OUTPATIENT
Start: 2024-11-17 | End: 2024-11-18

## 2024-11-17 RX ORDER — IBUPROFEN 200 MG
3 TABLET ORAL
Qty: 0 | Refills: 0 | DISCHARGE
Start: 2024-11-17

## 2024-11-17 RX ORDER — ACETAMINOPHEN 500MG 500 MG/1
975 TABLET, COATED ORAL
Refills: 0 | Status: DISCONTINUED | OUTPATIENT
Start: 2024-11-17 | End: 2024-11-18

## 2024-11-17 RX ORDER — OXYCODONE HYDROCHLORIDE 30 MG/1
1 TABLET ORAL
Qty: 10 | Refills: 0
Start: 2024-11-17

## 2024-11-17 RX ORDER — FOLIC ACID 1 MG/1
0 TABLET ORAL
Refills: 0 | DISCHARGE

## 2024-11-17 RX ORDER — PRENATAL VIT/IRON FUM/FOLIC AC 60 MG-1 MG
0 TABLET ORAL
Refills: 0 | DISCHARGE

## 2024-11-17 RX ORDER — IBUPROFEN 200 MG
600 TABLET ORAL EVERY 6 HOURS
Refills: 0 | Status: DISCONTINUED | OUTPATIENT
Start: 2024-11-17 | End: 2024-11-18

## 2024-11-17 RX ADMIN — Medication 5000 UNIT(S): at 08:43

## 2024-11-17 RX ADMIN — Medication 600 MILLIGRAM(S): at 23:43

## 2024-11-17 RX ADMIN — KETOROLAC TROMETHAMINE 30 MILLIGRAM(S): 30 INJECTION INTRAMUSCULAR; INTRAVENOUS at 00:00

## 2024-11-17 RX ADMIN — ACETAMINOPHEN 500MG 975 MILLIGRAM(S): 500 TABLET, COATED ORAL at 15:30

## 2024-11-17 RX ADMIN — KETOROLAC TROMETHAMINE 30 MILLIGRAM(S): 30 INJECTION INTRAMUSCULAR; INTRAVENOUS at 06:11

## 2024-11-17 RX ADMIN — ACETAMINOPHEN 500MG 975 MILLIGRAM(S): 500 TABLET, COATED ORAL at 08:47

## 2024-11-17 RX ADMIN — Medication 5000 UNIT(S): at 21:00

## 2024-11-17 RX ADMIN — ACETAMINOPHEN 500MG 975 MILLIGRAM(S): 500 TABLET, COATED ORAL at 20:15

## 2024-11-17 RX ADMIN — ACETAMINOPHEN 500MG 975 MILLIGRAM(S): 500 TABLET, COATED ORAL at 09:30

## 2024-11-17 RX ADMIN — ACETAMINOPHEN 500MG 975 MILLIGRAM(S): 500 TABLET, COATED ORAL at 21:00

## 2024-11-17 RX ADMIN — OXYCODONE HYDROCHLORIDE 5 MILLIGRAM(S): 30 TABLET ORAL at 15:51

## 2024-11-17 RX ADMIN — ACETAMINOPHEN 500MG 975 MILLIGRAM(S): 500 TABLET, COATED ORAL at 14:59

## 2024-11-17 RX ADMIN — KETOROLAC TROMETHAMINE 30 MILLIGRAM(S): 30 INJECTION INTRAMUSCULAR; INTRAVENOUS at 05:33

## 2024-11-17 RX ADMIN — OXYCODONE HYDROCHLORIDE 5 MILLIGRAM(S): 30 TABLET ORAL at 16:30

## 2024-11-17 RX ADMIN — ACETAMINOPHEN 500MG 975 MILLIGRAM(S): 500 TABLET, COATED ORAL at 02:45

## 2024-11-17 RX ADMIN — ACETAMINOPHEN 500MG 975 MILLIGRAM(S): 500 TABLET, COATED ORAL at 02:09

## 2024-11-17 NOTE — DISCHARGE NOTE OB - HOSPITAL COURSE
Patient had uncomplicated  section.  Please see delivery note for details.  During postpartum course patient's vitals were stable, vaginal bleeding appropriate, and pain well controlled.  On day of discharge patient was ambulating, her pain controlled with oral medications, had adequate oral intake, and was voiding freely.  Discharge instructions and precautions were given.  Will return to clinic in 2-3 week for incision check and 6 weeks for postpartum visit.

## 2024-11-17 NOTE — PROGRESS NOTE ADULT - ASSESSMENT
40y/o POD#2 from rLTCS+BS. Patient is currently in stable condition.    #Routine Postpartum Care  - Continue with PO pain management  - Increase ambulation  - Continue regular diet    KENTRELL Andrews PGY1

## 2024-11-17 NOTE — DISCHARGE NOTE OB - PATIENT PORTAL LINK FT
You can access the FollowMyHealth Patient Portal offered by [facility] by registering at the following website: [email or web address]. By joining "[employer]’s FollowMyHealth portal, you will also be able to view your health information using other applications (apps) compatible with our system.

## 2024-11-17 NOTE — DISCHARGE NOTE OB - MEDICATION SUMMARY - MEDICATIONS TO TAKE
I will START or STAY ON the medications listed below when I get home from the hospital:    ibuprofen 200 mg oral tablet  -- 3 tab(s) by mouth every 6 hours  -- Indication: For History of     acetaminophen 500 mg oral tablet  -- 2 tab(s) by mouth every 6 hours  -- Indication: For History of     oxyCODONE 5 mg oral tablet  -- 1 tab(s) by mouth every 6 hours MDD: 4 tabs  -- Indication: For History of

## 2024-11-17 NOTE — DISCHARGE NOTE OB - MATERIALS PROVIDED
c/o abdominal pain, N/V/D
[facility] Screening Program/  Immunization Record/Breastfeeding Log/Breastfeeding Mother’s Support Group Information/Guide to Postpartum Care/[facility] Hearing Screen Program/Breastfeeding Guide and Packet/Birth Certificate Instructions/Discharge Medication Information for Patients and Families Pocket Guide

## 2024-11-17 NOTE — DISCHARGE NOTE OB - CARE PROVIDER_API CALL
Charis Haas  Obstetrics and Gynecology  [address], [address]  [place] [address]  Phone: [phone]  Fax: [phone]  Follow Up Time:

## 2024-11-17 NOTE — DISCHARGE NOTE OB - FINANCIAL ASSISTANCE
[facility] provides services at a reduced cost to those who are determined to be eligible through [facility]’s financial assistance program. Information regarding [facility]’s financial assistance program can be found by going to [email or web address] or by calling [phone].

## 2024-11-17 NOTE — DISCHARGE NOTE OB - NS MD DC FALL RISK RISK
For information on Fall & Injury Prevention, visit: [email or web address] OR  [email or web address] OR  https://www.cdc.gov/steadi/patient.html

## 2024-11-17 NOTE — DISCHARGE NOTE OB - CARE PLAN
Principal Discharge DX:	 delivery delivered  Assessment and plan of treatment:	Pt had uncomplicated postpartum course and stable for dc home   1

## 2024-11-17 NOTE — PROGRESS NOTE ADULT - SUBJECTIVE AND OBJECTIVE BOX
Patient seen and examined at bedside, no acute overnight events. No acute complaints, pain well controlled. Patient is ambulating and tolerating regular diet. Has passed flatus and voided spontaneously. Denies CP, SOB, N/V, HA, blurred vision, epigastric pain.    Vital Signs Last 24 Hours  T(C): 36.8 (11-16-24 @ 21:00), Max: 37.2 (11-16-24 @ 13:00)  HR: 76 (11-16-24 @ 21:00) (70 - 82)  BP: 95/54 (11-16-24 @ 21:00) (90/48 - 99/61)  RR: 18 (11-16-24 @ 21:00) (18 - 18)  SpO2: 97% (11-16-24 @ 21:00) (96% - 97%)    I&O's Summary    15 Nov 2024 07:01  -  16 Nov 2024 07:00  --------------------------------------------------------  IN: 0 mL / OUT: 1139 mL / NET: -1139 mL    16 Nov 2024 07:01  -  17 Nov 2024 03:38  --------------------------------------------------------  IN: 0 mL / OUT: 2200 mL / NET: -2200 mL        Physical Exam:  General: NAD  Abdomen: Soft, non-tender, non-distended, fundus firm  Incision: Pfannenstiel incision CDI, subcuticular suture closure  Pelvic: Lochia wnl, external exam of perineum clean and dry without swelling      11-15-24 @ 07:01  -  11-16-24 @ 07:00  --------------------------------------------------------  IN: 0 mL / OUT: 1139 mL / NET: -1139 mL    11-16-24 @ 07:01  -  11-17-24 @ 03:38  --------------------------------------------------------  IN: 0 mL / OUT: 2200 mL / NET: -2200 mL        Blood Type: A Positive  Antibody Screen: Negative  RPR: Negative               8.8    13.58 )-----------( 219      ( 11-16 @ 06:30 )             26.9                10.0   15.57 )-----------( 236      ( 11-16 @ 01:04 )             30.5                11.5   7.95  )-----------( 246      ( 11-08 @ 16:14 )             35.3         MEDICATIONS  (STANDING):  acetaminophen   Oral Liquid .. 975 milliGRAM(s) Oral every 6 hours  diphtheria/tetanus/pertussis (acellular) Vaccine (Adacel) 0.5 milliLiter(s) IntraMuscular once  heparin   Injectable 5000 Unit(s) SubCutaneous every 12 hours  ibuprofen  Tablet. 600 milliGRAM(s) Oral every 6 hours  influenza   Vaccine 0.5 milliLiter(s) IntraMuscular once  ketorolac   Injectable 30 milliGRAM(s) IV Push every 6 hours  lactated ringers Bolus 1000 milliLiter(s) IV Bolus once  lactated ringers. 1000 milliLiter(s) (125 mL/Hr) IV Continuous <Continuous>  oxytocin Infusion 42 milliUNIT(s)/Min (42 mL/Hr) IV Continuous <Continuous>    MEDICATIONS  (PRN):  diphenhydrAMINE 25 milliGRAM(s) Oral every 6 hours PRN Pruritus  lanolin Ointment 1 Application(s) Topical every 6 hours PRN Sore Nipples  magnesium hydroxide Suspension 30 milliLiter(s) Oral two times a day PRN Constipation  oxyCODONE    IR 5 milliGRAM(s) Oral every 3 hours PRN Moderate to Severe Pain (4-10)  oxyCODONE    IR 5 milliGRAM(s) Oral once PRN Moderate to Severe Pain (4-10)  simethicone 80 milliGRAM(s) Chew every 4 hours PRN Gas

## 2024-11-17 NOTE — DISCHARGE NOTE OB - BREASTFEEDING PROVIDES MATERNAL HEALTH BENEFITS, DECREASED PREMENOPAUSAL BREAST CANCER, OVARIAN CANCER AND TYPE II DIABETES MELLITUS
Patient Instructions by Diamond Miranda MD at 09/28/18 02:18 PM     Author:  Diamond Miranda MD Service:  (none) Author Type:  Physician     Filed:  09/28/18 02:18 PM Encounter Date:  9/28/2018 Status:  Signed     :  Diamond Miranda MD (Physician)              Fetal development  Definition   Learn how your baby is conceived and how your baby develops inside the mother's womb.   Alternative Names   Zygote; Blastocyst; Embryo; Fetus   Information   WEEK BY WEEK CHANGES   Gestation is the period of time between conception and birth when a baby grows and develops inside the mother's womb. Because it's impossible to know exactly when conception occurs, gestational age is measured from the first day of the mother's last menstrual cycle to the current date. It is measured in weeks.   This means that during weeks 1 and 2 of pregnancy, a woman is not yet pregnant. This is when her body is preparing for a baby. A normal gestation lasts anywhere from 37 to 42 weeks.   Week 1 to 2   · The first week of pregnancy starts with the first day of a woman's menstrual period. She is not yet pregnant.  · During the end of the second week, an egg is released from an ovary. This is when you are most likely to conceive if you have unprotected intercourse.  Week 3   · During intercourse, sperm enters the vagina after the man ejaculates. The strongest sperm will travel through the cervix (the opening of the womb, or uterus), and into the fallopian tubes.  · A single sperm and the mother's egg cell meet in the fallopian tube. When the single sperm enters the egg, conception occurs. The combined sperm and egg is called a zygote.  · The zygote contains all of the genetic information (DNA) needed to become a baby. Half the DNA comes from the mother's egg and half from the father's sperm.  · The zygote spends the next few days traveling down the fallopian tube. During this time, it divides to form a ball of cells called a  blastocyst.  · A blastocyst is made up of an inner group of cells with an outer shell.  · The inner group of cells will become the embryo. The embryo is what will develop into your baby.  · The outer group of cells will become structures, called membranes, which nourish and protect the embryo.  Week 4   · Once the blastocyst reaches the uterus, it buries itself in the uterine wall.  · At this point in the mother's menstrual cycle, the lining of the uterus is thick with blood and ready to support a baby.  · The blastocyst sticks tightly to the wall of the uterus and receives nourishment from the mother's blood.  Week 5   · Week 5 is the start of the \"embryonic period.\" This is when all the baby's major systems and structures develop.  · The embryo's cells multiply and start to take on specific functions. This is called differentiation.  · Blood cells, kidney cells, and nerve cells all develop.  · The embryo grows rapidly, and the baby's external features begin to form.  · Your baby's brain, spinal cord, and heart begin to develop.  · Baby's gastrointestinal tract starts to form.  · It is during this time in the first trimester that the baby is most at risk for damage from things that may cause birth defects. This includes certain medicines, illegal drug use, heavy alcohol use, infections such as rubella, and other factors.  Weeks 6 to 7   · Arm and leg buds start to grow.  · Your baby's brain forms into 5 different areas. Some cranial nerves are visible.  · Eyes and ears begin to form.  · Tissue grows that will become your baby's spine and other bones.  · Baby's heart continues to grow and now beats at a regular rhythm.  · Blood pumps through the main vessels.  Week 8   · Baby's arms and legs have grown longer.  · Hands and feet begin to form and look like little paddles.  · Your baby's brain continues to grow.  · The lungs start to form.  Week 9   · Nipples and hair follicles form.  · Arms grow and elbows  develop.  · Baby's toes can be seen.  · All baby's essential organs have begun to grow.  Week 10   · Your baby's eyelids are more developed and begin to close.  · The outer ears begin to take shape.  · Baby's facial features become more distinct.  · The intestines rotate.  · At the end of the 10th week of pregnancy, your baby is no longer an embryo. It is now a fetus, the stage of development up until birth.  Weeks 11 to 14   · Your baby's eyelids close and will not reopen until about the 28th week.  · Baby's face is well-formed.  · Limbs are long and thin.  · Nails appear on the fingers and toes.  · Genitals appear.  · Baby's liver is making red blood cells.  · The head is very large -- about half of baby's size.  · Your little one can now make a fist.  · Tooth buds appear for the baby teeth.  Weeks 15 to 18   · At this stage, baby's skin is almost transparent.  · Fine hair called lanugo develops on baby's head.  · Muscle tissue and bones keep developing, and bones become harder.  · Baby begins to move and stretch.  · The liver and pancreas produce secretions.  · Your little one now makes sucking motions.  Weeks 19 to 21   · Your baby can hear.  · The baby is more active and continues to move and float around.  · The mother may feel a fluttering in the lower abdomen. This is called quickening, when mom can feel baby's first movements.  · By the end of this time, baby can swallow.  Week 22   · Lanugo hair covers baby's entire body.  · Meconium, baby's first bowel movement, is made in the intestinal tract.  · Eyebrows and lashes appear.  · The baby is more active with increased muscle development.  · The mother can feel the baby moving.  · Baby's heartbeat can be heard with a stethoscope.  · Nails grow to the end of baby's fingers.  Weeks 23 to 25   · Bone marrow begins to make blood cells.  · The lower airways of the baby's lungs develop.  · Your baby begins to store fat.  Week 26   · Eyebrows and eyelashes are  well-formed.  · All parts of baby's eyes are developed.  · Your baby may startle in response to loud noises.  · Footprints and fingerprints are forming.  · Air sacs form in baby's lungs, but lungs are still not ready to work outside the womb.  Weeks 27 to 30   · Baby's brain grows rapidly.  · The nervous system is developed enough to control some body functions.  · Your baby's eyelids can open and close.  · The respiratory system, while immature, produces surfactant. This substance helps the air sacs fill with air.  Weeks 31 to 34   · Your baby grows quickly and gains a lot of fat.  · Rhythmic breathing occurs, but baby's lungs are not fully mature.  · Baby's bones are fully developed, but are still soft.  · Your baby's body begins storing iron, calcium, and phosphorus.  Weeks 35 to 37   · Baby weighs about 5 1/2 pounds (2.5 kilograms).  · Your baby keeps gaining weight, but will probably not get much longer.  · The skin is not as wrinkled as fat forms under the skin.  · Baby has definite sleeping patterns.  · Your little one's heart and blood vessels are complete.  · Muscles and bones are fully developed.  Week 38 to 40   · Lanugo is gone except for on the upper arms and shoulders.  · Fingernails may extend beyond fingertips.  · Small breast buds are present on both sexes.  · Head hair is now coarse and thicker.  · In your 40th week of pregnancy, it has been 38 weeks since conception, and your baby could be born any day now.  References   Devi KHAN. Assessment of fetal growth and development. In: Lily RM, Dom BF, StChilo Anna JW, Narda NF, eds. [employer] Textbook of Pediatrics. 20th ed. [place], [place]: Elsevier; 2016:chap 8.   Yoandy MG, Yamil MG. Fetal development and physiology. In: Mary SG, Kesha JR, Ramiro JL, et al, eds. Obstetrics: Normal and Problem Pregnancies. 7th ed. [place], [place]: Elsevier; 2017:chap 2.      Review Date: 8/26/2017  Reviewed By: Ye Gonzales MD, FACOG,  of  [employer] at [employer] at [facility], [place], [place]. Review provided by [employer]. Also reviewed by David Zieve, MD, MHA, Medical Director, Nikky Escalera, , and the A.D.A.M. Editorial team.            Revision History        User Key Date/Time User Provider Type Action    > [N/A] 09/28/18 02:18 PM Diamond Miranda MD Physician Sign             Statement Selected

## 2024-11-18 ENCOUNTER — NON-APPOINTMENT (OUTPATIENT)
Age: 39
End: 2024-11-18

## 2024-11-18 VITALS
DIASTOLIC BLOOD PRESSURE: 61 MMHG | RESPIRATION RATE: 16 BRPM | SYSTOLIC BLOOD PRESSURE: 96 MMHG | OXYGEN SATURATION: 96 % | HEART RATE: 77 BPM | TEMPERATURE: 97 F

## 2024-11-18 DIAGNOSIS — Z34.91 ENCOUNTER FOR SUPERVISION OF NORMAL PREGNANCY, UNSPECIFIED, FIRST TRIMESTER: ICD-10-CM

## 2024-11-18 DIAGNOSIS — Z34.93 ENCOUNTER FOR SUPERVISION OF NORMAL PREGNANCY, UNSPECIFIED, THIRD TRIMESTER: ICD-10-CM

## 2024-11-18 PROCEDURE — 82962 GLUCOSE BLOOD TEST: CPT

## 2024-11-18 PROCEDURE — 88302 TISSUE EXAM BY PATHOLOGIST: CPT

## 2024-11-18 PROCEDURE — 86780 TREPONEMA PALLIDUM: CPT

## 2024-11-18 PROCEDURE — 86850 RBC ANTIBODY SCREEN: CPT

## 2024-11-18 PROCEDURE — 59050 FETAL MONITOR W/REPORT: CPT

## 2024-11-18 PROCEDURE — 59025 FETAL NON-STRESS TEST: CPT

## 2024-11-18 PROCEDURE — 86900 BLOOD TYPING SEROLOGIC ABO: CPT

## 2024-11-18 PROCEDURE — C1765: CPT

## 2024-11-18 PROCEDURE — 85025 COMPLETE CBC W/AUTO DIFF WBC: CPT

## 2024-11-18 PROCEDURE — 86901 BLOOD TYPING SEROLOGIC RH(D): CPT

## 2024-11-18 RX ORDER — OXYCODONE 5 MG/1
5 TABLET ORAL
Qty: 10 | Refills: 0 | Status: ACTIVE | COMMUNITY
Start: 2024-11-18 | End: 1900-01-01

## 2024-11-18 RX ADMIN — Medication 600 MILLIGRAM(S): at 12:15

## 2024-11-18 RX ADMIN — ACETAMINOPHEN 500MG 975 MILLIGRAM(S): 500 TABLET, COATED ORAL at 14:10

## 2024-11-18 RX ADMIN — ACETAMINOPHEN 500MG 975 MILLIGRAM(S): 500 TABLET, COATED ORAL at 02:32

## 2024-11-18 RX ADMIN — Medication 5000 UNIT(S): at 10:09

## 2024-11-18 RX ADMIN — Medication 600 MILLIGRAM(S): at 11:38

## 2024-11-18 RX ADMIN — ACETAMINOPHEN 500MG 975 MILLIGRAM(S): 500 TABLET, COATED ORAL at 14:30

## 2024-11-18 RX ADMIN — ACETAMINOPHEN 500MG 975 MILLIGRAM(S): 500 TABLET, COATED ORAL at 09:30

## 2024-11-18 RX ADMIN — ACETAMINOPHEN 500MG 975 MILLIGRAM(S): 500 TABLET, COATED ORAL at 08:58

## 2024-11-18 RX ADMIN — ACETAMINOPHEN 500MG 975 MILLIGRAM(S): 500 TABLET, COATED ORAL at 03:15

## 2024-11-18 RX ADMIN — Medication 600 MILLIGRAM(S): at 00:15

## 2024-11-18 NOTE — PROGRESS NOTE ADULT - ASSESSMENT
INCOMPLETE    A/P: 40yo POD #3 s/p rLTCS+BS.  Patient is stable and doing well post-operatively.      #Postpartum  - Continue regular diet.  - Increase ambulation as tolerated.  - Continue standing Ibuprofen and Acetaminophen for pain. Oxycodone available PRN for breakthrough pain.    - DVT prophylaxis with Heparin 5000u BID    Nina Kaur, PGY-1 A/P: 38yo POD #3 s/p rLTCS+BS.  Patient is stable and doing well post-operatively.      #Postpartum  - Continue regular diet.  - Increase ambulation as tolerated.  - Continue standing Ibuprofen and Acetaminophen for pain. Oxycodone available PRN for breakthrough pain.    - DVT prophylaxis with Heparin 5000u BID    Nina Kaur, PGY-1

## 2024-11-18 NOTE — PROGRESS NOTE ADULT - SUBJECTIVE AND OBJECTIVE BOX
INCOMPLETE    OB Postpartum Note:  Delivery    S: 40yo now POD #3 s/p rLTCS+BS. Her pain is well controlled. She is tolerating a regular diet and passing flatus. Voiding spontaneously and ambulating without difficulty. Denies N/V. Denies CP/SOB/lightheadedness/dizziness.     O:   Vitals:  Vital Signs Last 24 Hrs  T(C): 36.8 (2024 21:00), Max: 37 (2024 06:18)  T(F): 98.3 (2024 21:00), Max: 98.6 (2024 06:18)  HR: 69 (2024 21:00) (69 - 80)  BP: 103/66 (2024 21:00) (99/68 - 104/68)  BP(mean): --  RR: 18 (2024 21:00) (18 - 18)  SpO2: 96% (:00) (96% - 98%)    Parameters below as of 2024 21:00  Patient On (Oxygen Delivery Method): room air        MEDICATIONS  (STANDING):  acetaminophen     Tablet .. 975 milliGRAM(s) Oral <User Schedule>  diphtheria/tetanus/pertussis (acellular) Vaccine (Adacel) 0.5 milliLiter(s) IntraMuscular once  heparin   Injectable 5000 Unit(s) SubCutaneous every 12 hours  ibuprofen  Tablet. 600 milliGRAM(s) Oral every 6 hours  influenza   Vaccine 0.5 milliLiter(s) IntraMuscular once  lactated ringers Bolus 1000 milliLiter(s) IV Bolus once  lactated ringers. 1000 milliLiter(s) (125 mL/Hr) IV Continuous <Continuous>  oxytocin Infusion 42 milliUNIT(s)/Min (42 mL/Hr) IV Continuous <Continuous>    MEDICATIONS  (PRN):  diphenhydrAMINE 25 milliGRAM(s) Oral every 6 hours PRN Pruritus  lanolin Ointment 1 Application(s) Topical every 6 hours PRN Sore Nipples  magnesium hydroxide Suspension 30 milliLiter(s) Oral two times a day PRN Constipation  oxyCODONE    IR 5 milliGRAM(s) Oral once PRN Moderate to Severe Pain (4-10)  oxyCODONE    IR 5 milliGRAM(s) Oral every 3 hours PRN Moderate to Severe Pain (4-10)  simethicone 80 milliGRAM(s) Chew every 4 hours PRN Gas      Labs:  Blood type: A Positive  Rubella IgG: RPR: Negative                          8.8[L]   13.58[H] >-----------< 219    (  @ 06:30 )             26.9[L]                        10.0[L]   15.57[H] >-----------< 236    (  @ 01:04 )             30.5[L]      Physical Exam:  General: NAD  Abdomen: Soft, non-tender, non-distended, fundus firm  Incision: Pfannenstiel incision CDI, subcuticular suture closure  Pelvic: Lochia wnl, external exam of perineum clean and dry without swelling     OB Postpartum Note:  Delivery    S: 40yo now POD #3 s/p rLTCS+BS. Her pain is well controlled. She is tolerating a regular diet and passing flatus but still feeling bloated. No BM. Voiding spontaneously and ambulating without difficulty. Denies N/V. Denies CP/SOB/lightheadedness/dizziness.     O:   Vitals:  Vital Signs Last 24 Hrs  T(C): 36.8 (2024 21:00), Max: 37 (2024 06:18)  T(F): 98.3 (2024 21:00), Max: 98.6 (2024 06:18)  HR: 69 (:00) (69 - 80)  BP: 103/66 (2024 21:00) (99/68 - 104/68)  BP(mean): --  RR: 18 (2024 21:00) (18 - 18)  SpO2: 96% (:00) (96% - 98%)    Parameters below as of 2024 21:00  Patient On (Oxygen Delivery Method): room air        MEDICATIONS  (STANDING):  acetaminophen     Tablet .. 975 milliGRAM(s) Oral <User Schedule>  diphtheria/tetanus/pertussis (acellular) Vaccine (Adacel) 0.5 milliLiter(s) IntraMuscular once  heparin   Injectable 5000 Unit(s) SubCutaneous every 12 hours  ibuprofen  Tablet. 600 milliGRAM(s) Oral every 6 hours  influenza   Vaccine 0.5 milliLiter(s) IntraMuscular once  lactated ringers Bolus 1000 milliLiter(s) IV Bolus once  lactated ringers. 1000 milliLiter(s) (125 mL/Hr) IV Continuous <Continuous>  oxytocin Infusion 42 milliUNIT(s)/Min (42 mL/Hr) IV Continuous <Continuous>    MEDICATIONS  (PRN):  diphenhydrAMINE 25 milliGRAM(s) Oral every 6 hours PRN Pruritus  lanolin Ointment 1 Application(s) Topical every 6 hours PRN Sore Nipples  magnesium hydroxide Suspension 30 milliLiter(s) Oral two times a day PRN Constipation  oxyCODONE    IR 5 milliGRAM(s) Oral once PRN Moderate to Severe Pain (4-10)  oxyCODONE    IR 5 milliGRAM(s) Oral every 3 hours PRN Moderate to Severe Pain (4-10)  simethicone 80 milliGRAM(s) Chew every 4 hours PRN Gas      Labs:  Blood type: A Positive  Rubella IgG: RPR: Negative                          8.8[L]   13.58[H] >-----------< 219    (  @ 06:30 )             26.9[L]                        10.0[L]   15.57[H] >-----------< 236    (  @ 01:04 )             30.5[L]      Physical Exam:  General: NAD  Abdomen: Soft, non-tender, non-distended, fundus firm  Incision: Pfannenstiel incision CDI, subcuticular suture closure  Pelvic: Lochia wnl, external exam of perineum clean and dry without swelling

## 2024-11-18 NOTE — PROGRESS NOTE ADULT - ATTENDING COMMENTS
OB attg note    38yo P2 now POD2 from repeat CS and BS. Pt overall doing well but reports discomfort with walking. Minimal lochia, +ambulating, passing flatus. VSS, exam benign with incision c/d/i. Pt desires dc home tomorrow.    Vital Signs Last 24 Hrs  T(C): 37 (17 Nov 2024 06:18), Max: 37.2 (16 Nov 2024 13:00)  T(F): 98.6 (17 Nov 2024 06:18), Max: 98.9 (16 Nov 2024 13:00)  HR: 73 (17 Nov 2024 06:18) (70 - 76)  BP: 100/65 (17 Nov 2024 06:18) (90/48 - 100/65)  BP(mean): --  RR: 18 (17 Nov 2024 06:18) (18 - 18)  SpO2: 98% (17 Nov 2024 06:18) (96% - 98%)    Parameters below as of 17 Nov 2024 06:18  Patient On (Oxygen Delivery Method): room air                          8.8    13.58 )-----------( 219      ( 16 Nov 2024 06:30 )             26.9   Gabby LOPEZ
OB attg note    40yo P1 now POD1 from scheduled repeat CS and BS. Had vasovagal episode last night when in bathroom but now feels better. H/H stable at that time. VSS, incision c/d/i. Pain controlled, tolerating PO, ambulating and passing flatus. FOr routine postop care.    Gabby LOPEZ
Patient doing well  Tolerating Po  Ambulating   BP in normal range  Cleared for discharge   Office visit in 2 weeks   iMke

## 2024-11-21 LAB — SURGICAL PATHOLOGY STUDY: SIGNIFICANT CHANGE UP

## 2024-12-12 ENCOUNTER — TRANSCRIPTION ENCOUNTER (OUTPATIENT)
Age: 39
End: 2024-12-12

## 2024-12-12 PROBLEM — Z86.19 PERSONAL HISTORY OF OTHER INFECTIOUS AND PARASITIC DISEASES: Chronic | Status: ACTIVE | Noted: 2024-11-08

## 2024-12-13 ENCOUNTER — NON-APPOINTMENT (OUTPATIENT)
Age: 39
End: 2024-12-13

## 2024-12-13 ENCOUNTER — APPOINTMENT (OUTPATIENT)
Dept: OBGYN | Facility: CLINIC | Age: 39
End: 2024-12-13
Payer: COMMERCIAL

## 2024-12-13 VITALS — SYSTOLIC BLOOD PRESSURE: 98 MMHG | DIASTOLIC BLOOD PRESSURE: 60 MMHG | BODY MASS INDEX: 27.78 KG/M2 | WEIGHT: 147 LBS

## 2024-12-13 DIAGNOSIS — Z32.01 ENCOUNTER FOR PREGNANCY TEST, RESULT POSITIVE: ICD-10-CM

## 2024-12-13 PROCEDURE — 0503F POSTPARTUM CARE VISIT: CPT

## 2024-12-13 PROCEDURE — G0444 DEPRESSION SCREEN ANNUAL: CPT | Mod: 59

## 2024-12-31 ENCOUNTER — APPOINTMENT (OUTPATIENT)
Dept: OBGYN | Facility: CLINIC | Age: 39
End: 2024-12-31